# Patient Record
Sex: MALE | Race: WHITE
[De-identification: names, ages, dates, MRNs, and addresses within clinical notes are randomized per-mention and may not be internally consistent; named-entity substitution may affect disease eponyms.]

---

## 2018-03-24 ENCOUNTER — HOSPITAL ENCOUNTER (EMERGENCY)
Dept: HOSPITAL 26 - MED | Age: 73
Discharge: HOME | End: 2018-03-24
Payer: COMMERCIAL

## 2018-03-24 VITALS — DIASTOLIC BLOOD PRESSURE: 110 MMHG | SYSTOLIC BLOOD PRESSURE: 177 MMHG

## 2018-03-24 VITALS — DIASTOLIC BLOOD PRESSURE: 86 MMHG | SYSTOLIC BLOOD PRESSURE: 162 MMHG

## 2018-03-24 VITALS — HEIGHT: 74 IN | BODY MASS INDEX: 34.39 KG/M2 | WEIGHT: 268 LBS

## 2018-03-24 DIAGNOSIS — I10: Primary | ICD-10-CM

## 2018-03-24 DIAGNOSIS — E78.5: ICD-10-CM

## 2018-03-24 NOTE — NUR
Patient discharged with v/s stable. Written and verbal after care instructions 
given and explained. 

Patient alert, oriented and verbalized understanding of instructions. 
Ambulatory with steady gait. All questions addressed prior to discharge. ID 
band removed. Patient advised to follow up with PMD. Rx of HCTZ given. Patient 
educated on indication of medication including possible reaction and side 
effects. Opportunity to ask questions provided and answered.